# Patient Record
Sex: FEMALE | Race: WHITE | NOT HISPANIC OR LATINO | Employment: STUDENT | URBAN - METROPOLITAN AREA
[De-identification: names, ages, dates, MRNs, and addresses within clinical notes are randomized per-mention and may not be internally consistent; named-entity substitution may affect disease eponyms.]

---

## 2023-07-31 DIAGNOSIS — Z00.00 ROUTINE GENERAL MEDICAL EXAMINATION AT A HEALTH CARE FACILITY: Primary | ICD-10-CM

## 2023-08-18 ENCOUNTER — HOSPITAL ENCOUNTER (OUTPATIENT)
Dept: RADIOLOGY | Facility: HOSPITAL | Age: 23
Discharge: HOME | End: 2023-08-18
Attending: INTERNAL MEDICINE
Payer: COMMERCIAL

## 2023-08-18 ENCOUNTER — HOSPITAL ENCOUNTER (OUTPATIENT)
Dept: CARDIOLOGY | Facility: HOSPITAL | Age: 23
Discharge: HOME | End: 2023-08-18
Attending: INTERNAL MEDICINE
Payer: COMMERCIAL

## 2023-08-18 ENCOUNTER — CLINICAL SUPPORT (OUTPATIENT)
Dept: INTERNAL MEDICINE | Facility: CLINIC | Age: 23
End: 2023-08-18
Payer: COMMERCIAL

## 2023-08-18 ENCOUNTER — OFFICE VISIT (OUTPATIENT)
Dept: INTERNAL MEDICINE | Facility: CLINIC | Age: 23
End: 2023-08-18
Payer: COMMERCIAL

## 2023-08-18 VITALS
DIASTOLIC BLOOD PRESSURE: 63 MMHG | WEIGHT: 163 LBS | SYSTOLIC BLOOD PRESSURE: 112 MMHG | TEMPERATURE: 97 F | HEART RATE: 70 BPM | BODY MASS INDEX: 27.83 KG/M2 | HEIGHT: 64 IN

## 2023-08-18 DIAGNOSIS — Z00.00 ROUTINE GENERAL MEDICAL EXAMINATION AT A HEALTH CARE FACILITY: Primary | ICD-10-CM

## 2023-08-18 DIAGNOSIS — Z00.00 ROUTINE GENERAL MEDICAL EXAMINATION AT A HEALTH CARE FACILITY: ICD-10-CM

## 2023-08-18 DIAGNOSIS — F41.9 ANXIETY: ICD-10-CM

## 2023-08-18 LAB
ALBUMIN SERPL BCP-MCNC: 4.1 G/DL (ref 3.5–5.2)
ALP SERPL-CCNC: 55 U/L (ref 55–135)
ALT SERPL W/O P-5'-P-CCNC: 21 U/L (ref 10–44)
ANION GAP SERPL CALC-SCNC: 7 MMOL/L (ref 8–16)
AST SERPL-CCNC: 21 U/L (ref 10–40)
BILIRUB SERPL-MCNC: 0.6 MG/DL (ref 0.1–1)
BUN SERPL-MCNC: 14 MG/DL (ref 6–20)
CALCIUM SERPL-MCNC: 9.3 MG/DL (ref 8.7–10.5)
CHLORIDE SERPL-SCNC: 107 MMOL/L (ref 95–110)
CHOLEST SERPL-MCNC: 161 MG/DL (ref 120–199)
CHOLEST/HDLC SERPL: 2.6 {RATIO} (ref 2–5)
CO2 SERPL-SCNC: 25 MMOL/L (ref 23–29)
CREAT SERPL-MCNC: 0.7 MG/DL (ref 0.5–1.4)
ERYTHROCYTE [DISTWIDTH] IN BLOOD BY AUTOMATED COUNT: 11.8 % (ref 11.5–14.5)
EST. GFR  (NO RACE VARIABLE): >60 ML/MIN/1.73 M^2
ESTIMATED AVG GLUCOSE: 97 MG/DL (ref 68–131)
GLUCOSE SERPL-MCNC: 86 MG/DL (ref 70–110)
HBA1C MFR BLD: 5 % (ref 4–5.6)
HCT VFR BLD AUTO: 36.6 % (ref 37–48.5)
HCV AB SERPL QL IA: NORMAL
HDLC SERPL-MCNC: 61 MG/DL (ref 40–75)
HDLC SERPL: 37.9 % (ref 20–50)
HGB BLD-MCNC: 12.3 G/DL (ref 12–16)
HIV 1+2 AB+HIV1 P24 AG SERPL QL IA: NORMAL
LDLC SERPL CALC-MCNC: 90.2 MG/DL (ref 63–159)
MCH RBC QN AUTO: 30.8 PG (ref 27–31)
MCHC RBC AUTO-ENTMCNC: 33.6 G/DL (ref 32–36)
MCV RBC AUTO: 92 FL (ref 82–98)
NONHDLC SERPL-MCNC: 100 MG/DL
PLATELET # BLD AUTO: 207 K/UL (ref 150–450)
PMV BLD AUTO: 11.7 FL (ref 9.2–12.9)
POTASSIUM SERPL-SCNC: 4.2 MMOL/L (ref 3.5–5.1)
PROT SERPL-MCNC: 7.1 G/DL (ref 6–8.4)
RBC # BLD AUTO: 4 M/UL (ref 4–5.4)
SODIUM SERPL-SCNC: 139 MMOL/L (ref 136–145)
TRIGL SERPL-MCNC: 49 MG/DL (ref 30–150)
TSH SERPL DL<=0.005 MIU/L-ACNC: 1.37 UIU/ML (ref 0.4–4)
WBC # BLD AUTO: 5.55 K/UL (ref 3.9–12.7)

## 2023-08-18 PROCEDURE — 87389 HIV-1 AG W/HIV-1&-2 AB AG IA: CPT | Performed by: INTERNAL MEDICINE

## 2023-08-18 PROCEDURE — 71046 XR CHEST PA AND LATERAL: ICD-10-PCS | Mod: 26,,, | Performed by: RADIOLOGY

## 2023-08-18 PROCEDURE — 71046 X-RAY EXAM CHEST 2 VIEWS: CPT | Mod: TC

## 2023-08-18 PROCEDURE — 85027 COMPLETE CBC AUTOMATED: CPT | Performed by: INTERNAL MEDICINE

## 2023-08-18 PROCEDURE — 93005 ELECTROCARDIOGRAM TRACING: CPT

## 2023-08-18 PROCEDURE — 3008F BODY MASS INDEX DOCD: CPT | Mod: CPTII,S$GLB,, | Performed by: INTERNAL MEDICINE

## 2023-08-18 PROCEDURE — 84443 ASSAY THYROID STIM HORMONE: CPT | Performed by: INTERNAL MEDICINE

## 2023-08-18 PROCEDURE — 3044F HG A1C LEVEL LT 7.0%: CPT | Mod: CPTII,S$GLB,, | Performed by: INTERNAL MEDICINE

## 2023-08-18 PROCEDURE — 3078F DIAST BP <80 MM HG: CPT | Mod: CPTII,S$GLB,, | Performed by: INTERNAL MEDICINE

## 2023-08-18 PROCEDURE — 3008F PR BODY MASS INDEX (BMI) DOCUMENTED: ICD-10-PCS | Mod: CPTII,S$GLB,, | Performed by: INTERNAL MEDICINE

## 2023-08-18 PROCEDURE — 83036 HEMOGLOBIN GLYCOSYLATED A1C: CPT | Performed by: INTERNAL MEDICINE

## 2023-08-18 PROCEDURE — 82306 VITAMIN D 25 HYDROXY: CPT | Performed by: INTERNAL MEDICINE

## 2023-08-18 PROCEDURE — 1159F MED LIST DOCD IN RCRD: CPT | Mod: CPTII,S$GLB,, | Performed by: INTERNAL MEDICINE

## 2023-08-18 PROCEDURE — 3078F PR MOST RECENT DIASTOLIC BLOOD PRESSURE < 80 MM HG: ICD-10-PCS | Mod: CPTII,S$GLB,, | Performed by: INTERNAL MEDICINE

## 2023-08-18 PROCEDURE — 80053 COMPREHEN METABOLIC PANEL: CPT | Performed by: INTERNAL MEDICINE

## 2023-08-18 PROCEDURE — 1159F PR MEDICATION LIST DOCUMENTED IN MEDICAL RECORD: ICD-10-PCS | Mod: CPTII,S$GLB,, | Performed by: INTERNAL MEDICINE

## 2023-08-18 PROCEDURE — 99999 PR PBB SHADOW E&M-EST. PATIENT-LVL III: ICD-10-PCS | Mod: PBBFAC,,, | Performed by: INTERNAL MEDICINE

## 2023-08-18 PROCEDURE — 99385 PREV VISIT NEW AGE 18-39: CPT | Mod: 25,S$GLB,, | Performed by: INTERNAL MEDICINE

## 2023-08-18 PROCEDURE — 3074F SYST BP LT 130 MM HG: CPT | Mod: CPTII,S$GLB,, | Performed by: INTERNAL MEDICINE

## 2023-08-18 PROCEDURE — 99385 PR PREVENTIVE VISIT,NEW,18-39: ICD-10-PCS | Mod: 25,S$GLB,, | Performed by: INTERNAL MEDICINE

## 2023-08-18 PROCEDURE — 99999 PR PBB SHADOW E&M-EST. PATIENT-LVL III: CPT | Mod: PBBFAC,,, | Performed by: INTERNAL MEDICINE

## 2023-08-18 PROCEDURE — 80061 LIPID PANEL: CPT | Performed by: INTERNAL MEDICINE

## 2023-08-18 PROCEDURE — 71046 X-RAY EXAM CHEST 2 VIEWS: CPT | Mod: 26,,, | Performed by: RADIOLOGY

## 2023-08-18 PROCEDURE — 3044F PR MOST RECENT HEMOGLOBIN A1C LEVEL <7.0%: ICD-10-PCS | Mod: CPTII,S$GLB,, | Performed by: INTERNAL MEDICINE

## 2023-08-18 PROCEDURE — 93010 ELECTROCARDIOGRAM REPORT: CPT | Mod: ,,, | Performed by: INTERNAL MEDICINE

## 2023-08-18 PROCEDURE — 3074F PR MOST RECENT SYSTOLIC BLOOD PRESSURE < 130 MM HG: ICD-10-PCS | Mod: CPTII,S$GLB,, | Performed by: INTERNAL MEDICINE

## 2023-08-18 PROCEDURE — 86803 HEPATITIS C AB TEST: CPT | Performed by: INTERNAL MEDICINE

## 2023-08-18 PROCEDURE — 93010 EKG 12-LEAD: ICD-10-PCS | Mod: ,,, | Performed by: INTERNAL MEDICINE

## 2023-08-18 RX ORDER — SERTRALINE HYDROCHLORIDE 25 MG/1
75 TABLET, FILM COATED ORAL DAILY
Qty: 270 TABLET | Refills: 3 | Status: SHIPPED | OUTPATIENT
Start: 2023-08-18 | End: 2023-09-12 | Stop reason: SDUPTHER

## 2023-08-18 NOTE — PROGRESS NOTES
"Subjective:      Patient ID: Farhan Ren is a 23 y.o. female.    Chief Complaint: Executive Health    HPI    It was a pleasure to meet you for your executive health physical on August 18, 2023.    At the time of your visit you were 23 years old.      You have a past medical history of anxiety and PCOS.    Your current medications include sertraline 25 mg daily.    Your family history includes:  Father- atrial fibrillation and diabetes  Mother- history of stroke  Siblings- healthy    Preventative healthcare:   Tetanus vaccine- declined  Remember flu vaccine should be available in September or October.     Review of Systems   Constitutional:  Negative for chills and fever.   HENT:  Negative for ear pain and sore throat.    Respiratory:  Negative for cough.    Cardiovascular:  Negative for chest pain.   Gastrointestinal:  Negative for abdominal pain and blood in stool.   Genitourinary:  Negative for dysuria and hematuria.   Neurological:  Negative for seizures and syncope.     Objective:   /63   Pulse 70   Temp 97.2 °F (36.2 °C)   Ht 5' 4" (1.626 m)   Wt 73.9 kg (163 lb)   LMP 08/09/2023   BMI 27.98 kg/m²     Physical Exam  Constitutional:       General: She is not in acute distress.     Appearance: She is well-developed.   HENT:      Head: Normocephalic and atraumatic.   Eyes:      Extraocular Movements: Extraocular movements intact.   Neck:      Thyroid: No thyromegaly.   Cardiovascular:      Rate and Rhythm: Normal rate and regular rhythm.   Pulmonary:      Breath sounds: Normal breath sounds. No wheezing or rales.   Abdominal:      General: Bowel sounds are normal.      Palpations: Abdomen is soft.      Tenderness: There is no abdominal tenderness.   Musculoskeletal:         General: No swelling.      Cervical back: Neck supple. No rigidity.   Lymphadenopathy:      Cervical: No cervical adenopathy.   Skin:     General: Skin is warm and dry.   Neurological:      Mental Status: She is alert and " oriented to person, place, and time.   Psychiatric:         Behavior: Behavior normal.         Lab Results   Component Value Date    WBC 5.55 08/18/2023    HGB 12.3 08/18/2023    HGB 12.4 01/03/2023    HCT 36.6 (L) 08/18/2023    MCV 92 08/18/2023     08/18/2023    CHOL 161 08/18/2023    TRIG 49 08/18/2023    HDL 61 08/18/2023    LDLCALC 90.2 08/18/2023    ALT 21 08/18/2023    AST 21 08/18/2023     08/18/2023    K 4.2 08/18/2023    CALCIUM 9.3 08/18/2023     08/18/2023    CO2 25 08/18/2023    BUN 14 08/18/2023    CREATININE 0.7 08/18/2023    EGFRNORACEVR >60 08/18/2023    TSH 1.373 08/18/2023    TSH 2.470 02/11/2022    GLU 86 08/18/2023    HGBA1C 5.0 08/18/2023    HGBA1C 5.3 02/11/2022    [id number] 32 08/18/2023          The ASCVD Risk score (Adriana DK, et al., 2019) failed to calculate for the following reasons:    The 2019 ASCVD risk score is only valid for ages 40 to 79     Assessment:     1. Routine general medical examination at a health care facility    2. Anxiety      Plan:   1. Routine general medical examination at a health care facility  Heart healthy diet, regular exercise, and regular use of sunscreen.   HM reviewed  -     Vitamin D; Future; Expected date: 08/18/2023    2. Anxiety  Stable on zoloft  -     sertraline (ZOLOFT) 25 MG tablet; Take 3 tablets (75 mg total) by mouth once daily.  Dispense: 270 tablet; Refill: 3        There are no Patient Instructions on file for this visit.    No future appointments.        No follow-ups on file.

## 2023-08-19 LAB — 25(OH)D3+25(OH)D2 SERPL-MCNC: 32 NG/ML (ref 30–96)

## 2023-09-12 DIAGNOSIS — F41.9 ANXIETY: ICD-10-CM

## 2023-09-12 RX ORDER — SERTRALINE HYDROCHLORIDE 25 MG/1
75 TABLET, FILM COATED ORAL DAILY
Qty: 270 TABLET | Refills: 3 | Status: SHIPPED | OUTPATIENT
Start: 2023-09-12 | End: 2024-09-11

## 2023-09-12 RX ORDER — SERTRALINE HYDROCHLORIDE 25 MG/1
75 TABLET, FILM COATED ORAL DAILY
Qty: 270 TABLET | Refills: 3 | Status: CANCELLED | OUTPATIENT
Start: 2023-09-12 | End: 2024-09-11

## 2023-09-12 NOTE — TELEPHONE ENCOUNTER
No care due was identified.  Health [facility] Embedded Care Due Messages. Reference number: [id number].   9/12/2023 9:29:43 AM CDT

## 2024-06-24 ENCOUNTER — TELEPHONE (OUTPATIENT)
Dept: INTERNAL MEDICINE | Facility: CLINIC | Age: 24
End: 2024-06-24
Payer: COMMERCIAL

## 2024-06-24 NOTE — TELEPHONE ENCOUNTER
----- Message from Mattie Taveras sent at 6/24/2024  8:44 AM CDT -----  Contact: pt [phone]  Type:  Same Day Appointment Request    Caller is requesting a same day appointment.  Caller declined first available appointment listed below.      Name of Caller:  Pt   When is the first available appointment?  Thur   Symptoms:  migraine headache  Best Call Back Number:  [phone]    Additional Information:   Pls send message back on [place].

## 2025-07-25 ENCOUNTER — OFFICE VISIT (OUTPATIENT)
Dept: UROLOGY | Facility: CLINIC | Age: 25
End: 2025-07-25
Payer: COMMERCIAL

## 2025-07-25 VITALS
WEIGHT: 153.69 LBS | DIASTOLIC BLOOD PRESSURE: 71 MMHG | BODY MASS INDEX: 26.24 KG/M2 | RESPIRATION RATE: 17 BRPM | TEMPERATURE: 98 F | SYSTOLIC BLOOD PRESSURE: 100 MMHG | HEIGHT: 64 IN | HEART RATE: 83 BPM

## 2025-07-25 DIAGNOSIS — N39.0 URINARY TRACT INFECTION WITHOUT HEMATURIA, SITE UNSPECIFIED: ICD-10-CM

## 2025-07-25 DIAGNOSIS — N39.0 RECURRENT UTI: Primary | ICD-10-CM

## 2025-07-25 LAB
BILIRUBIN, UA POC OHS: NEGATIVE
BLOOD, UA POC OHS: NEGATIVE
CLARITY, UA POC OHS: CLEAR
COLOR, UA POC OHS: YELLOW
GLUCOSE, UA POC OHS: NEGATIVE
KETONES, UA POC OHS: NEGATIVE
LEUKOCYTES, UA POC OHS: NEGATIVE
NITRITE, UA POC OHS: NEGATIVE
PH, UA POC OHS: 7.5
POC RESIDUAL URINE VOLUME: 21 ML (ref 0–100)
PROTEIN, UA POC OHS: NEGATIVE
SPECIFIC GRAVITY, UA POC OHS: 1.01
UROBILINOGEN, UA POC OHS: 0.2

## 2025-07-25 PROCEDURE — 87798 DETECT AGENT NOS DNA AMP: CPT | Performed by: UROLOGY

## 2025-07-25 PROCEDURE — 99999 PR PBB SHADOW E&M-EST. PATIENT-LVL IV: CPT | Mod: PBBFAC,,, | Performed by: UROLOGY

## 2025-07-25 PROCEDURE — 87563 M. GENITALIUM AMP PROBE: CPT | Performed by: UROLOGY

## 2025-07-25 RX ORDER — ALPRAZOLAM 0.5 MG/1
TABLET ORAL
Qty: 1 TABLET | Refills: 0 | Status: SHIPPED | OUTPATIENT
Start: 2025-07-25

## 2025-07-25 RX ORDER — SPIRONOLACTONE 25 MG/1
50 TABLET ORAL DAILY
COMMUNITY
Start: 2025-03-03

## 2025-07-25 NOTE — PROGRESS NOTES
Chief Complaint   Patient presents with    [facility]     Recurrent uti       History of Present Illness:   Farhan Ren is a 25 y.o. female here for evaluation of [facility] (Recurrent uti)    [birth year]26yo female presents for evaluation of recurrent UTI. She is generally treated at [facility] in [facility]. She reports that her most recent UTI was about a month ago and prior to that she was treated for a UTI 3 weeks prior. She reports that she has been drinking uqora bladder flush drinks and it does help when she has dysuria, but this has been more frequent than usual. She had UTIs prior since childhood (middle school, about 1 a year).   UTI symptosm generally include dysuria and bladder spasms. She also has urinary frequency. No associated fever or gross hematuria. She tends to have soreness in her back, which doesn't lateralize. She was treated most recently with a rocephin injection and cipro x 7 days and the symptoms did subside. She was told that the culture was negative, but she had recently been on antibiotics for a UTI before culture was done. That was the only culture completed. She had a +UA and was treated with 10 days of macrobid prior to that but no culture completed at that time. Symptoms got better but never completely resolved. Currently, she feels okay, but did have to drink a uqora drink this past weekend. Prior to this episode of 2 UTIs, it had been a couple of years since her last UTI but she did have intermittent episodes of dysuria. She has noticed a certain carbonated apple flavored drink does cause her dysuria.         Review of Systems   Constitutional:  Negative for chills and fever.   Respiratory:  Negative for shortness of breath.    Cardiovascular:  Negative for chest pain.   Genitourinary:  Positive for dysuria.   All other systems reviewed and are negative.        Past Medical History:   Diagnosis Date    Anxiety     Heart murmur     when I was little       Past  Surgical History:   Procedure Laterality Date    ABDOMINAL SURGERY  08/2021    Cholecystectomy    CHOLECYSTECTOMY  Aug 2020    LAPAROSCOPIC CHOLECYSTECTOMY         Family History   Problem Relation Name Age of Onset    Polycystic ovary syndrome Mother Long Ren 30    Migraines Mother Long Ren     Stroke Mother Long Ren     Atrial fibrillation Father Doroteo Ren     Hypertension Father Doroteo Ren     Diabetes Father Doroteo Ren     Dementia Paternal Grandmother Shanel Ren     Breast cancer Paternal Grandmother Shanel Ren 70    Cancer Paternal Grandmother Shanel Ren     Liver cancer Paternal Grandfather Hunter Ren 75    Diabetes Paternal Grandfather Hunter Ren     Ovarian cancer Other Pat. GGM     Cancer Maternal Grandfather Sharri Gaureau         Liver    Hypertension Maternal Grandfather Gonterence Gaureau     Heart disease Maternal Grandfather Gonterence Gaureau     Osteoarthritis Maternal Grandmother Nathaly Aretha     Arthritis Maternal Grandmother Nathaly Aretha     Mental illness Maternal Grandmother Nathaly Aretha     Hypertension Maternal Uncle Denise [place]        Social History[1]    Current Medications[2]    Review of patient's allergies indicates:  No Known Allergies    Physical Exam  Vitals:    07/25/25 1006   BP: 100/71   Pulse: 83   Resp: 17   Temp: 98.2 °F (36.8 °C)     General: Well-developed, well-nourished, in no acute distress  HEENT: Normocephalic, atraumatic, extraocular movements intact  Neck: Supple, no supraclavicular or cervical lymphadenopathy, trachea midline  Respirations: even and unlabored  Back: midline spine, No CVA tenderness  Abdomen: soft, Non-tender, non-distended, no palpable masses, no rebound or guarding  Extremities: moves all equally, no clubbing, cyanosis or edema  Skin: Warm and dry. No lesions  Psych: normal affect  Neuro: Alert and oriented x 3. Cranial nerves II-XII intact    PVR:  21mL    Urinalysis  Negative for blood, LE, nit      Assessment:  1. Recurrent UTI  US Retroperitoneal Complete    C. trachomatis/N. gonorrhoeae by AMP DNA [facility]; Urine    Mycoplasma genitalium Molecular Detection, PCR Urine    Mycoplasma hominus Molecular detection PCR    Ureaplasma PCR    ALPRAZolam (XANAX) 0.5 MG tablet      2. Urinary tract infection without hematuria, site unspecified  POCT Urinalysis(Instrument)    POCT Bladder Scan            Plan:   Recurrent UTI  -     US Retroperitoneal Complete; Future; Expected date: 07/25/2025  -     C. trachomatis/N. gonorrhoeae by AMP DNA [facility]; Urine  -     Mycoplasma genitalium Molecular Detection, PCR Urine  -     Mycoplasma hominus Molecular detection PCR  -     Ureaplasma PCR  -     ALPRAZolam (XANAX) 0.5 MG tablet; Take 1 po 45 minutes before procedure  Dispense: 1 tablet; Refill: 0    Urinary tract infection without hematuria, site unspecified  -     POCT Urinalysis(Instrument)  -     POCT Bladder Scan          Follow up for Cystoscopy.                 [1]   Social History  Tobacco Use    Smoking status: Never    Smokeless tobacco: Never   Substance Use Topics    Alcohol use: Yes     Alcohol/week: 1.0 standard drink of alcohol     Types: 1 Glasses of wine per week     Comment: OCC.    Drug use: Never   [2]   Current Outpatient Medications   Medication Sig Dispense Refill    spironolactone (ALDACTONE) 25 MG tablet Take 50 mg by mouth once daily.      ALPRAZolam (XANAX) 0.5 MG tablet Take 1 po 45 minutes before procedure 1 tablet 0    sertraline (ZOLOFT) 25 MG tablet Take 3 tablets (75 mg total) by mouth once daily. 270 tablet 3     Current Facility-Administered Medications   Medication Dose Route Frequency Provider Last Rate Last Admin    copper 380 mm2 380 mm IUD  380 mm Intrauterine     380 mm at 03/19/25 7560

## 2025-08-18 ENCOUNTER — TELEPHONE (OUTPATIENT)
Dept: UROLOGY | Facility: CLINIC | Age: 25
End: 2025-08-18
Payer: COMMERCIAL